# Patient Record
(demographics unavailable — no encounter records)

---

## 2025-07-25 NOTE — ASSESSMENT
[FreeTextEntry1] : 65 F with PMHx pancreatic cysts and HLD presenting for follow up of pancreatic cystic lesions.   #Pancreatic cyst  -Will review most recent MRCP report when it is uploaded and discuss plan with patient  #Ear Fullness -Will refer to ENT via [facility]  F/u will be determined after reviewing recent MRCP report Due for colonoscopy in 2029.   IShelia NP, am scribing for and in the presence of Dr. Favian Knight the following sections: history of present illness, past medical/family/social history; review of systems; vital signs; physical exam; disposition.  Favian CARLSON MD, personally performed the services described in the documentation, reviewed the documentation recorded by the scribe in my presence and it accurately and completely records my words and actions.

## 2025-07-25 NOTE — PHYSICAL EXAM
[Normal] : normal hearing, normal lips.gums, normal oropharynx [Normal Appearance] : the appearance of the neck was normal [No Respiratory Distress] : no respiratory distress [Abdomen Tenderness] : non-tender [No Masses] : no abdominal mass palpated [Abdomen Soft] : soft [Abnormal Walk] : normal gait [Oriented To Time, Place, And Person] : oriented to person, place, and time

## 2025-07-25 NOTE — HISTORY OF PRESENT ILLNESS
[FreeTextEntry1] : 65 F with PMHx pancreatic cysts and HLD presenting for follow up of pancreatic cystic lesions.   Patient denies GI complaints including abdominal pain, N/V, unintentional weight loss, changes in bowel habits.  Patient just had recommended repeat MR/MRCP this past week-do not have report yet and patient brought in CD today.   Patient states she had a colonoscopy this past year with her primary GI MD in [place], states she had the finding of 1 polyp and was told to repeat in 5 years (2029).  Last MR/MRCP (2/5/24) with findings of interval increase in size of cyst in pancreatic body measuring 1.1 cm, previously 0.8 cm. Stable pancreatic cysts measuring 0.9 cm in uncinate process. There are additional scattered tiny cysts without significant change.  Pt states she has had a new issue with eustachian tube and her past ENT told her there was "nothing to be done". Would like a second opinion as she has "fullness" sensation in her L ear.  Denies other major medical changes since last OV.   12/8/23:  -MR/MRCP (6/16/21): several small cysts scattered in the pancreas noted without change. The largest and most discrete one seen at the head/uncinate level measuring 9 mm. Second focus of small cysts measuring 8 mm at body. No significant change -Reports feeling well otherwise since her last visit with no complaints of abdominal pain, nausea/vomiting, unintentional weight loss, changes in bowel habits.  -Reports last colonoscopy to be approx 8 years ago, no polyps noted then and recommended for repeat study in 10 yrs.   4/23/21: The patient was previously followed by Dr. Knight at his prior practice.  She underwent EUS on 10/22/2015 that showed a 6 mm x 5 mm cyst in the pancreatic body and a 7 mm by 6 mm cyst in the pancreatic head.  Patient underwent MR/MRCP without contrast on 11/07/2016 that showed stable pancreatic cysts.  Repeat MR/MRCP was advised in 2 years which was completed on 03/22/2019 and again showed stable pancreatic cysts; 6 mm in the uncinate process and 5 mm in the pancreatic body.  Repeat MR/MRCP was advised in 2 years; 2021.  The patient states that after one prior MRI with contrast, she developed hives and had to be treated at the facility.  Subsequent MRI in 2019 with contrast did not cause a reaction.   Patient presents today and states that she feels generally well except for a recent increase in stress.  She reports checking her blood pressure at home and getting readings above 130/85.  She will be following up with her PCP in 2 weeks.  Patient denies nausea, vomiting, abdominal pain, changes in bowel habits, dark stool and BRBPR.  Patient admits to intact appetite and stable weight.  Patient denies family history of colon, gastric and pancreatic cancer.  She reports a family history of colon polyps in her sister.  She states that her last colonoscopy was about 8 years ago and was normal.  Repeat was advised in 10 years.  The patient states that she does not recall the name of the doctor who completed the procedure but states that it was done in [place].